# Patient Record
Sex: FEMALE | Race: WHITE | Employment: OTHER | URBAN - NONMETROPOLITAN AREA
[De-identification: names, ages, dates, MRNs, and addresses within clinical notes are randomized per-mention and may not be internally consistent; named-entity substitution may affect disease eponyms.]

---

## 2017-02-13 DIAGNOSIS — F43.10 PTSD (POST-TRAUMATIC STRESS DISORDER): ICD-10-CM

## 2017-02-13 RX ORDER — QUETIAPINE FUMARATE 200 MG/1
TABLET, FILM COATED ORAL
Qty: 30 TABLET | Refills: 0 | Status: SHIPPED | OUTPATIENT
Start: 2017-02-13 | End: 2017-03-21

## 2017-03-21 DIAGNOSIS — F43.10 PTSD (POST-TRAUMATIC STRESS DISORDER): ICD-10-CM

## 2017-03-22 RX ORDER — QUETIAPINE FUMARATE 200 MG/1
TABLET, FILM COATED ORAL
Qty: 30 TABLET | Refills: 0 | Status: SHIPPED | OUTPATIENT
Start: 2017-03-22 | End: 2017-04-19

## 2017-04-19 DIAGNOSIS — F43.10 PTSD (POST-TRAUMATIC STRESS DISORDER): ICD-10-CM

## 2017-04-19 NOTE — TELEPHONE ENCOUNTER
Seroquel     Last Written Prescription Date:3 /22/17  Last Fill Quantity: 30, # refills: 0  [place] [facility] [place] [facility] prescribing provider: 6/27/16       BP Readings from Last 3 Encounters:   06/27/16 128/74   04/06/16 116/72   03/23/16 100/68     Pulse Readings from Last 2 Encounters:   06/27/16 83   04/06/16 76     Lab Results   Component Value Date    GLC 76 12/16/2010     Lab Results   Component Value Date    WBC 7.8 12/16/2010     Lab Results   Component Value Date    RBC 4.35 12/16/2010     Lab Results   Component Value Date    HGB 12.8 12/16/2010     Lab Results   Component Value Date    HCT 38.7 12/16/2010     No components found for: MCT  Lab Results   Component Value Date    MCV 89 12/16/2010     Lab Results   Component Value Date    MCH 29.4 12/16/2010     Lab Results   Component Value Date    MCHC 33.1 12/16/2010     Lab Results   Component Value Date    RDW 13.0 12/16/2010     Lab Results   Component Value Date     12/16/2010     No results found for: CHOL  No results found for: HDL  No results found for: LDL  No results found for: TRIG  No results found for: CHOLHDLRATIO

## 2017-04-20 RX ORDER — QUETIAPINE FUMARATE 200 MG/1
TABLET, FILM COATED ORAL
Qty: 30 TABLET | Refills: 2 | Status: SHIPPED | OUTPATIENT
Start: 2017-04-20 | End: 2018-06-01

## 2017-04-20 NOTE — TELEPHONE ENCOUNTER
Thanks Margaret, I'm going to fill with 2 refills and I'll include note as part of script *need to schedule psychiatry follow-up [phone] for further refills.   I'm reviewing her chart and per Kamran Ag's last note I see Kim was in a pretty stressful situation including doesn't have the $ for transportation to [place].

## 2017-05-27 ENCOUNTER — TRANSFERRED RECORDS (OUTPATIENT)
Dept: HEALTH INFORMATION MANAGEMENT | Facility: HOSPITAL | Age: 27
End: 2017-05-27

## 2017-07-04 DIAGNOSIS — F40.10 SOCIAL ANXIETY DISORDER: ICD-10-CM

## 2017-07-06 RX ORDER — PROPRANOLOL HYDROCHLORIDE 10 MG/1
TABLET ORAL
Qty: 90 TABLET | Refills: 0 | Status: SHIPPED | OUTPATIENT
Start: 2017-07-06 | End: 2017-08-19

## 2017-07-06 NOTE — TELEPHONE ENCOUNTER
Propranolol 10mg      Last Written Prescription Date: 7-5-2016  Last Fill Quantity: 90, # refills: 11    Last Office Visit with G, P or [facility] prescribing provider:  6-   Future Office Visit:        BP Readings from Last 3 Encounters:   06/27/16 128/74   04/06/16 116/72   03/23/16 100/68

## 2017-07-06 NOTE — TELEPHONE ENCOUNTER
Inderal     Last Written Prescription Date: 07/05/2016  Last Fill Quantity: 90, # refills: 11  Last Office Visit with [facility] [place] care provider:  06/27/2016        Last PHQ-9 score on record=   PHQ-9 SCORE 6/27/2016   Total Score 10

## 2017-08-04 ENCOUNTER — TRANSFERRED RECORDS (OUTPATIENT)
Dept: HEALTH INFORMATION MANAGEMENT | Facility: HOSPITAL | Age: 27
End: 2017-08-04

## 2017-08-19 DIAGNOSIS — F40.10 SOCIAL ANXIETY DISORDER: ICD-10-CM

## 2017-08-21 NOTE — TELEPHONE ENCOUNTER
Inderal      Last Written Prescription Date: 7/6/17  Last Fill Quantity: 90, # refills: 0  [place] [facility] [place]:  6/27/16        Last PHQ-9 score on record=   PHQ-9 SCORE 6/27/2016   Total Score 10

## 2017-08-22 RX ORDER — PROPRANOLOL HYDROCHLORIDE 10 MG/1
TABLET ORAL
Qty: 30 TABLET | Refills: 0 | Status: SHIPPED | OUTPATIENT
Start: 2017-08-22 | End: 2017-09-29

## 2017-08-22 NOTE — TELEPHONE ENCOUNTER
Please see notes below.  Patient is due for office visit and PHQ.  Medication pended.  Please advise.  Thank you.

## 2017-08-23 DIAGNOSIS — F40.10 SOCIAL ANXIETY DISORDER: ICD-10-CM

## 2017-08-23 RX ORDER — PROPRANOLOL HYDROCHLORIDE 10 MG/1
TABLET ORAL
Qty: 30 TABLET | OUTPATIENT
Start: 2017-08-23

## 2017-08-23 NOTE — TELEPHONE ENCOUNTER
Inderal      Last Written Prescription Date: 08/22/2017  Last Fill Quantity: 30, # refills: 0    Last Office Visit with G, P or [facility] prescribing provider:  03/23/2016   Future Office Visit:        BP Readings from Last 3 Encounters:   06/27/16 128/74   04/06/16 116/72   03/23/16 100/68

## 2017-08-24 NOTE — TELEPHONE ENCOUNTER
CALLED PATIENT AND INFORMED HER MEDICATION WAS REFILLED AND APPT IS NEEDED SHE ASKED FOR MORE INFORMATION AND PHONE DISCONNECTED,

## 2017-09-08 DIAGNOSIS — F40.10 SOCIAL ANXIETY DISORDER: ICD-10-CM

## 2017-09-08 NOTE — TELEPHONE ENCOUNTER
Propranolol last filled on 8.22.17 #30. Last office visit on 6.24.16. No upcoming appointments. Medications pended.Thank you.

## 2017-09-11 RX ORDER — PROPRANOLOL HYDROCHLORIDE 10 MG/1
TABLET ORAL
Qty: 30 TABLET | Refills: 0 | OUTPATIENT
Start: 2017-09-11

## 2017-09-19 DIAGNOSIS — F40.10 SOCIAL ANXIETY DISORDER: ICD-10-CM

## 2017-09-19 NOTE — TELEPHONE ENCOUNTER
Propranolol       Last Written Prescription Date: 8/22/2017  Last Fill Quantity: 30, # refills: 0    Last Office Visit with G, P or [facility] prescribing provider:  6/27/2016   Future Office Visit:        BP Readings from Last 3 Encounters:   06/27/16 128/74   04/06/16 116/72   03/23/16 100/68

## 2017-09-20 RX ORDER — PROPRANOLOL HYDROCHLORIDE 10 MG/1
TABLET ORAL
Qty: 30 TABLET | Refills: 0 | OUTPATIENT
Start: 2017-09-20

## 2017-09-21 NOTE — TELEPHONE ENCOUNTER
Called patient and she has not taken the medication for months so does not need it refilled but I stated to her that the provider has not seen her for over a year and to schedule appt to keep current she states understanding

## 2017-09-29 DIAGNOSIS — F40.10 SOCIAL ANXIETY DISORDER: ICD-10-CM

## 2017-09-29 RX ORDER — PROPRANOLOL HYDROCHLORIDE 10 MG/1
TABLET ORAL
Qty: 30 TABLET | Refills: 0 | Status: SHIPPED | OUTPATIENT
Start: 2017-09-29 | End: 2018-06-01

## 2017-09-29 NOTE — TELEPHONE ENCOUNTER
Patient of Samantha. Propranolol last filled on 8.22.17 #30. Last office visit on 6.22.16.No upcoming appointments.Mediction pended.Thank you

## 2017-10-29 ENCOUNTER — HEALTH MAINTENANCE LETTER (OUTPATIENT)
Age: 27
End: 2017-10-29

## 2018-04-04 ENCOUNTER — TRANSFERRED RECORDS (OUTPATIENT)
Dept: HEALTH INFORMATION MANAGEMENT | Facility: CLINIC | Age: 28
End: 2018-04-04

## 2018-05-17 ENCOUNTER — TRANSFERRED RECORDS (OUTPATIENT)
Dept: HEALTH INFORMATION MANAGEMENT | Facility: CLINIC | Age: 28
End: 2018-05-17

## 2018-05-31 RX ORDER — HYDROXYZINE HYDROCHLORIDE 10 MG/1
30 TABLET, FILM COATED ORAL
COMMUNITY
End: 2018-06-18

## 2018-05-31 RX ORDER — GABAPENTIN 300 MG/1
400 TABLET, FILM COATED ORAL
COMMUNITY
End: 2018-06-18 | Stop reason: ALTCHOICE

## 2018-06-01 ENCOUNTER — OFFICE VISIT (OUTPATIENT)
Dept: PSYCHIATRY | Facility: OTHER | Age: 28
End: 2018-06-01
Attending: NURSE PRACTITIONER
Payer: COMMERCIAL

## 2018-06-01 VITALS
TEMPERATURE: 98.6 F | HEART RATE: 60 BPM | BODY MASS INDEX: 28.52 KG/M2 | SYSTOLIC BLOOD PRESSURE: 116 MMHG | WEIGHT: 155 LBS | OXYGEN SATURATION: 98 % | DIASTOLIC BLOOD PRESSURE: 68 MMHG | HEIGHT: 62 IN

## 2018-06-01 DIAGNOSIS — F43.10 PTSD (POST-TRAUMATIC STRESS DISORDER): ICD-10-CM

## 2018-06-01 DIAGNOSIS — F60.3 BORDERLINE PERSONALITY DISORDER (H): Primary | ICD-10-CM

## 2018-06-01 DIAGNOSIS — F31.9 BIPOLAR I DISORDER (H): ICD-10-CM

## 2018-06-01 DIAGNOSIS — Z76.0 ENCOUNTER FOR MEDICATION REFILL: ICD-10-CM

## 2018-06-01 LAB
AMPHETAMINES UR QL SCN: NEGATIVE
AMPHETAMINES UR QL: NOT DETECTED NG/ML
BARBITURATES UR QL SCN: NOT DETECTED NG/ML
BARBITURATES UR QL: NEGATIVE
BENZODIAZ UR QL SCN: NOT DETECTED NG/ML
BENZODIAZ UR QL: NEGATIVE
BUPRENORPHINE UR QL: ABNORMAL NG/ML
CANNABINOIDS UR QL SCN: POSITIVE
CANNABINOIDS UR QL: ABNORMAL NG/ML
COCAINE UR QL SCN: NOT DETECTED NG/ML
COCAINE UR QL: NEGATIVE
D-METHAMPHET UR QL: NOT DETECTED NG/ML
ERYTHROCYTE [DISTWIDTH] IN BLOOD BY AUTOMATED COUNT: 12.3 % (ref 10–15)
HCT VFR BLD AUTO: 39.2 % (ref 35–47)
HGB BLD-MCNC: 13.5 G/DL (ref 11.7–15.7)
MCH RBC QN AUTO: 30.9 PG (ref 26.5–33)
MCHC RBC AUTO-ENTMCNC: 34.4 G/DL (ref 31.5–36.5)
MCV RBC AUTO: 90 FL (ref 78–100)
METHADONE UR QL SCN: NEGATIVE
METHADONE UR QL SCN: NOT DETECTED NG/ML
OPIATES UR QL SCN: NEGATIVE
OPIATES UR QL SCN: NOT DETECTED NG/ML
OXYCODONE UR QL SCN: NOT DETECTED NG/ML
PCP UR QL SCN: NEGATIVE
PCP UR QL SCN: NOT DETECTED NG/ML
PLATELET # BLD AUTO: 261 10E9/L (ref 150–450)
PROPOXYPH UR QL: NOT DETECTED NG/ML
RBC # BLD AUTO: 4.37 10E12/L (ref 3.8–5.2)
TRICYCLICS UR QL SCN: NOT DETECTED NG/ML
WBC # BLD AUTO: 8.6 10E9/L (ref 4–11)

## 2018-06-01 PROCEDURE — 85027 COMPLETE CBC AUTOMATED: CPT | Mod: ZL | Performed by: NURSE PRACTITIONER

## 2018-06-01 PROCEDURE — 80307 DRUG TEST PRSMV CHEM ANLYZR: CPT | Mod: ZL | Performed by: NURSE PRACTITIONER

## 2018-06-01 PROCEDURE — G0463 HOSPITAL OUTPT CLINIC VISIT: HCPCS

## 2018-06-01 PROCEDURE — 36415 COLL VENOUS BLD VENIPUNCTURE: CPT | Mod: ZL | Performed by: NURSE PRACTITIONER

## 2018-06-01 PROCEDURE — 99213 OFFICE O/P EST LOW 20 MIN: CPT | Performed by: NURSE PRACTITIONER

## 2018-06-01 ASSESSMENT — PAIN SCALES - GENERAL: PAINLEVEL: NO PAIN (0)

## 2018-06-01 ASSESSMENT — ANXIETY QUESTIONNAIRES
4. TROUBLE RELAXING: MORE THAN HALF THE DAYS
6. BECOMING EASILY ANNOYED OR IRRITABLE: NEARLY EVERY DAY
IF YOU CHECKED OFF ANY PROBLEMS ON THIS QUESTIONNAIRE, HOW DIFFICULT HAVE THESE PROBLEMS MADE IT FOR YOU TO DO YOUR WORK, TAKE CARE OF THINGS AT HOME, OR GET ALONG WITH OTHER PEOPLE: SOMEWHAT DIFFICULT
7. FEELING AFRAID AS IF SOMETHING AWFUL MIGHT HAPPEN: MORE THAN HALF THE DAYS
5. BEING SO RESTLESS THAT IT IS HARD TO SIT STILL: MORE THAN HALF THE DAYS
3. WORRYING TOO MUCH ABOUT DIFFERENT THINGS: NEARLY EVERY DAY
2. NOT BEING ABLE TO STOP OR CONTROL WORRYING: NEARLY EVERY DAY
GAD7 TOTAL SCORE: 17
1. FEELING NERVOUS, ANXIOUS, OR ON EDGE: MORE THAN HALF THE DAYS
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
## 2018-06-01 NOTE — NURSING NOTE
"Chief Complaint   Patient presents with     Consult       Initial /68 (BP Location: Right arm, Patient Position: Sitting, Cuff Size: Adult Regular)  Pulse 60  Temp 98.6  F (37  C) (Tympanic)  Ht 5' 2\" (1.575 m)  Wt 155 lb (70.3 kg)  SpO2 98%  BMI 28.35 kg/m2 Estimated body mass index is 28.35 kg/(m^2) as calculated from the following:    Height as of this encounter: 5' 2\" (1.575 m).    Weight as of this encounter: 155 lb (70.3 kg).  Medication Reconciliation: complete    Keily Artis LPN  "

## 2018-06-01 NOTE — MR AVS SNAPSHOT
"              After Visit Summary   6/1/2018    Kim Oliva    MRN: [id number]           Patient Information     Date Of Birth          1990        Visit Information        Provider Department      6/1/2018 10:30 AM Venessa Gilman APRN [facility]        Today's Diagnoses     Borderline personality disorder    -  1    PTSD (post-traumatic stress disorder)        Encounter for medication refill        Bipolar I disorder (H)           Follow-ups after your visit        Who to contact     If you have questions or need follow up information about today's clinic visit or your schedule please contact [facility] directly at [phone].  Normal or non-critical lab and imaging results will be communicated to you by MyChart, letter or phone within 4 business days after the clinic has received the results. If you do not hear from us within 7 days, please contact the clinic through MyChart or phone. If you have a critical or abnormal lab result, we will notify you by phone as soon as possible.  Submit refill requests through [employer] or call your pharmacy and they will forward the refill request to us. Please allow 3 business days for your refill to be completed.          Additional Information About Your Visit        Care EveryWhere ID     This is your Care EveryWhere ID. This could be used by other organizations to access your [facility] medical records  [id number]        Your Vitals Were     Pulse Temperature Height Pulse Oximetry BMI (Body Mass Index)       60 98.6  F (37  C) (Tympanic) 5' 2\" (1.575 m) 98% 28.35 kg/m2        Blood Pressure from Last 3 Encounters:   06/01/18 116/68   06/27/16 128/74   04/06/16 116/72    Weight from Last 3 Encounters:   06/01/18 155 lb (70.3 kg)   06/27/16 170 lb (77.1 kg)   04/06/16 176 lb (79.8 kg)              We Performed the Following     CBC with platelets     Drug Screen Urine (Range)        Primary Care Provider Office Phone # Fax #    Denise " CHRSI Lynne [phone]       [address] [facility] [address]        Equal Access to Services     BRIAN ADAME : Hadii aad ku hadsuncorinna Castellon, walucianoda luqadaha, qaenmanuelta kaalmada marjorie, shanna dipikain hayaaya reddybeba cariadán sofie gomez. So [facility] [phone].    ATENCIÓN: Si habla español, tiene a castillo disposición servicios gratuitos de asistencia lingüística. Llame al [phone].    We comply with applicable federal civil rights laws and [place] laws. We do not discriminate on the basis of race, color, national origin, age, disability, sex, sexual orientation, or gender identity.            Thank you!     Thank you for choosing [facility]  for your care. Our goal is always to provide you with excellent care. Hearing back from our patients is one way we can continue to improve our services. Please take a few minutes to complete the written survey that you may receive in the mail after your visit with us. Thank you!             Your Updated Medication List - Protect others around you: Learn how to safely use, store and throw away your medicines at www.disposemymeds.org.          This list is accurate as of 6/1/18 11:41 AM.  Always use your most recent med list.                   Brand Name Dispense Instructions for use Diagnosis    gabapentin 300 MG 24 hr tablet    GRALISE     Take 400 mg by mouth        gabapentin 300 MG capsule    NEURONTIN    90 capsule    Take 1 capsule (300 mg) by mouth 3 times daily    JAYLEEN (generalized anxiety disorder)       hydrOXYzine 10 MG tablet    ATARAX     Take 30 mg by mouth        mirtazapine 15 MG tablet    REMERON    30 tablet    Take 1 tablet (15 mg) by mouth At Bedtime    Anxiety       propranolol 10 MG tablet    INDERAL    30 tablet    TAKE 1 TABLET BY MOUTH THREE TIMES DAILY    Social anxiety disorder       QUEtiapine 200 MG tablet    SEROquel    30 tablet    Take 1 tab evening as needed *need to schedule follow-up apptmt for  further refills [phone]    PTSD (post-traumatic stress disorder)       sertraline 100 MG tablet    ZOLOFT    45 tablet    Take 1.5 tablets (150 mg) by mouth daily    Anxiety

## 2018-06-02 ASSESSMENT — PATIENT HEALTH QUESTIONNAIRE - PHQ9: SUM OF ALL RESPONSES TO PHQ QUESTIONS 1-9: 10

## 2018-06-02 ASSESSMENT — ANXIETY QUESTIONNAIRES: GAD7 TOTAL SCORE: 17

## 2018-06-04 LAB — MISCELLANEOUS TEST: NORMAL

## 2018-06-18 ENCOUNTER — OFFICE VISIT (OUTPATIENT)
Dept: PSYCHIATRY | Facility: OTHER | Age: 28
End: 2018-06-18
Attending: NURSE PRACTITIONER
Payer: COMMERCIAL

## 2018-06-18 VITALS
SYSTOLIC BLOOD PRESSURE: 124 MMHG | OXYGEN SATURATION: 98 % | HEART RATE: 77 BPM | TEMPERATURE: 99.8 F | DIASTOLIC BLOOD PRESSURE: 80 MMHG

## 2018-06-18 DIAGNOSIS — F41.9 ANXIETY: Primary | ICD-10-CM

## 2018-06-18 DIAGNOSIS — F43.12 NIGHTMARES ASSOCIATED WITH CHRONIC POST-TRAUMATIC STRESS DISORDER: ICD-10-CM

## 2018-06-18 DIAGNOSIS — F33.1 MAJOR DEPRESSIVE DISORDER, RECURRENT EPISODE, MODERATE (H): ICD-10-CM

## 2018-06-18 DIAGNOSIS — F31.9 BIPOLAR 1 DISORDER, DEPRESSED (H): Primary | ICD-10-CM

## 2018-06-18 DIAGNOSIS — F41.9 ANXIETY: ICD-10-CM

## 2018-06-18 DIAGNOSIS — F51.5 NIGHTMARES ASSOCIATED WITH CHRONIC POST-TRAUMATIC STRESS DISORDER: ICD-10-CM

## 2018-06-18 PROCEDURE — 99213 OFFICE O/P EST LOW 20 MIN: CPT | Performed by: NURSE PRACTITIONER

## 2018-06-18 PROCEDURE — G0463 HOSPITAL OUTPT CLINIC VISIT: HCPCS

## 2018-06-18 RX ORDER — MIRTAZAPINE 15 MG/1
15 TABLET, FILM COATED ORAL AT BEDTIME
Qty: 30 TABLET | Refills: 0 | Status: SHIPPED | OUTPATIENT
Start: 2018-06-18 | End: 2018-06-25 | Stop reason: DRUGHIGH

## 2018-06-18 RX ORDER — BUSPIRONE HYDROCHLORIDE 15 MG/1
15 TABLET ORAL 2 TIMES DAILY
Qty: 60 TABLET | Refills: 0 | Status: SHIPPED | OUTPATIENT
Start: 2018-06-18 | End: 2018-06-25 | Stop reason: DRUGHIGH

## 2018-06-18 RX ORDER — PRAZOSIN HYDROCHLORIDE 2 MG/1
2 CAPSULE ORAL AT BEDTIME
Qty: 30 CAPSULE | Refills: 0 | Status: SHIPPED | OUTPATIENT
Start: 2018-06-18 | End: 2018-06-25 | Stop reason: DRUGHIGH

## 2018-06-18 RX ORDER — LAMOTRIGINE 150 MG/1
150 TABLET ORAL DAILY
Qty: 30 TABLET | Refills: 0 | Status: SHIPPED | OUTPATIENT
Start: 2018-06-18 | End: 2018-06-25 | Stop reason: DRUGHIGH

## 2018-06-18 RX ORDER — VENLAFAXINE HYDROCHLORIDE 150 MG/1
150 TABLET, EXTENDED RELEASE ORAL DAILY
Qty: 30 TABLET | Refills: 1 | Status: SHIPPED | OUTPATIENT
Start: 2018-06-18 | End: 2018-06-25 | Stop reason: DRUGHIGH

## 2018-06-18 RX ORDER — GABAPENTIN 600 MG/1
600 TABLET ORAL 3 TIMES DAILY
Qty: 90 TABLET | Refills: 0 | Status: SHIPPED | OUTPATIENT
Start: 2018-06-18 | End: 2018-06-25 | Stop reason: DRUGHIGH

## 2018-06-18 RX ORDER — HYDROXYZINE HYDROCHLORIDE 10 MG/1
30 TABLET, FILM COATED ORAL DAILY
Qty: 30 TABLET | Refills: 0 | Status: SHIPPED | OUTPATIENT
Start: 2018-06-18 | End: 2018-06-25 | Stop reason: DRUGHIGH

## 2018-06-18 ASSESSMENT — ANXIETY QUESTIONNAIRES
IF YOU CHECKED OFF ANY PROBLEMS ON THIS QUESTIONNAIRE, HOW DIFFICULT HAVE THESE PROBLEMS MADE IT FOR YOU TO DO YOUR WORK, TAKE CARE OF THINGS AT HOME, OR GET ALONG WITH OTHER PEOPLE: SOMEWHAT DIFFICULT
2. NOT BEING ABLE TO STOP OR CONTROL WORRYING: NEARLY EVERY DAY
5. BEING SO RESTLESS THAT IT IS HARD TO SIT STILL: MORE THAN HALF THE DAYS
GAD7 TOTAL SCORE: 17
7. FEELING AFRAID AS IF SOMETHING AWFUL MIGHT HAPPEN: MORE THAN HALF THE DAYS
3. WORRYING TOO MUCH ABOUT DIFFERENT THINGS: NEARLY EVERY DAY
4. TROUBLE RELAXING: MORE THAN HALF THE DAYS
6. BECOMING EASILY ANNOYED OR IRRITABLE: NEARLY EVERY DAY
1. FEELING NERVOUS, ANXIOUS, OR ON EDGE: MORE THAN HALF THE DAYS

## 2018-06-18 ASSESSMENT — PAIN SCALES - GENERAL: PAINLEVEL: NO PAIN (0)

## 2018-06-18 NOTE — PROGRESS NOTES
"PSYCHIATRY CLINIC PROGRESS NOTE   30 minute medication management, more than 50% of time spent counseling patient on medications, medication side effects, symptom history and management   SUBJECTIVE / INTERIM HISTORY                                                                       Kim presents with her little toddlerSelina. She is trying to transitioning from the doctor at [place]. She is also on 18mg Suboxone from [facility] in [place].    When asked whether the Suboxone clinic has also prescribed the Clonazepam, Kim says that it was [facility] that prescribed it at the same time as the Suboxone.   When asked about a primary, Kim says she does not have one at this time. She is encouraged to establish with primary care.  She is calling her pharmacy now because apparently our medication list is \"missing a few things.\"   She says she is \"out of all her medications right now\"   Her main issue is that she has big mood swings.  She gets to a low phase and just feels extreme depression and crying. She would like to increase the Lamictal,  and that we can do.  She knows to watch for rash.   She does endorse some moments of marco, and \"shops, doesn't sleep, gets grandiose with a lot of house cleaning\" then \"shuts down\" with depression and gets very low.   No AH/VH  No SI/ HI     SUBSTANCE USE- no issues, \"I'm in recovery\" though Tox 13 showed THC last visit, 06/05/2018, she says she has a prescription for medical MJ> Will bring that in.      SYMPTOMS- Anxiety, mood swings with hypomania and depression, No SI.  MEDICAL ROS- No N/V/d/C  MEDICAL / SURGICAL HISTORY                pregnant [if applicable]--no   Medical Team:     PMD-     Therapist-       Patient Active Problem List   Diagnosis     Bipolar 1      Anxiety                           ALLERGY   Review of patient's allergies indicates      Allergies   Allergen Reactions     Amoxicillin Hives     Fd&C Blue #1-Fd&C Red #40-Fluoxetine Other (See Comments)     " Other reaction(s): Joint Pain     Penicillins Hives     Prozac [Fluoxetine] Other (See Comments)     Muscle and bone aches and migraines         MEDICATIONS                                                                                                   Current Outpatient Prescriptions   Medication Sig       Current Outpatient Prescriptions:      hydrOXYzine (ATARAX) 10 MG tablet, Take 30 mg by mouth, Disp: , Rfl:      lamoTRIgine (LAMICTAL) 150 MG tablet, Take 1 tablet (150 mg) by mouth daily, Disp: 30 tablet, Rfl: 0     mirtazapine (REMERON) 15 MG tablet, Take 1 tablet (15 mg) by mouth At Bedtime, Disp: 30 tablet, Rfl: 2     gabapentin (NEURONTIN) 300 MG capsule, Take 1 capsule (300 mg) by mouth 3 times daily, Disp: 90 capsule, Rfl: 3                                               No current facility-administered medications for this visit.          VITALS    /80 (BP Location: Right arm, Patient Position: Sitting, Cuff Size: Adult Regular)  Pulse 77  Temp 99.8  F (37.7  C) (Tympanic)  SpO2 98%    PHQ9                        PHQ-9 SCORE date date date   Total Score - - -   Total Score 1 1          MENTAL STATUS EXAM                                                                                        Alert. Oriented to person, place, and date / time. Well groomed, calm, cooperative with good eye contact. No problems with speech or psychomotor behavior. Mood was euthymic and affect congruent to speech content and full range. Thought process, including associations, was unremarkable and thought content was devoid of suicidal and homicidal ideation and psychotic thought. No hallucinations. Insight was good. Judgment was intact and adequate for safety. Fund of knowledge was intact. Pt demonstrates no obvious problems with attention, concentration, language, recent or remote memory although these were not formally tested.      ASSESSMENT                                                                                                        HISTORICAL:  follow-up  psych consult       CURRENT: This patient provides a history which supports the diagnoses Bipolar 1, depressed mood and anxiety but no psychotic features.      TREATMENT RISK STATEMENT: The risks, benefits, alternatives and potential adverse effects have been explained and are understood by the pt. The pt agrees to the treatment plan with the ability to do so. The pt knows to call the clinic for any problems or access emergency care if needed.    DIAGNOSES            (F31.9) Bipolar 1 disorder, depressed (H)  (primary encounter diagnosis)       LABS         Office Visit on 06/01/2018   Component Date Value Ref Range Status     WBC 06/01/2018 8.6  4.0 - 11.0 10e9/L Final     RBC Count 06/01/2018 4.37  3.8 - 5.2 10e12/L Final     Hemoglobin 06/01/2018 13.5  11.7 - 15.7 g/dL Final     Hematocrit 06/01/2018 39.2  35.0 - 47.0 % Final     MCV 06/01/2018 90  78 - 100 fl Final     MCH 06/01/2018 30.9  26.5 - 33.0 pg Final     MCHC 06/01/2018 34.4  31.5 - 36.5 g/dL Final     RDW 06/01/2018 12.3  10.0 - 15.0 % Final     Platelet Count 06/01/2018 261  150 - 450 10e9/L Final     Amphetamine Qual Urine 06/01/2018 Negative  NEG^Negative Final    Cutoff for a negative amphetamine is 500 ng/mL or less.     Barbiturates Qual Urine 06/01/2018 Negative  NEG^Negative Final    Cutoff for a negative barbiturate is 200 ng/mL or less.     Benzodiazepine Qual Urine 06/01/2018 Negative  NEG^Negative Final    Cutoff for a negative benzodiazepine is 200 ng/mL or less.     Cannabinoids Qual Urine 06/01/2018 Positive* NEG^Negative Final    Comment: Cutoff for a positive cannabinoid is greater than 50 ng/mL. This is an   unconfirmed screening result to be used for medical purposes only.       Cocaine Qual Urine 06/01/2018 Negative  NEG^Negative Final    Cutoff for a negative cocaine is 300 ng/mL or less.     Opiates Qualitative Urine 06/01/2018 Negative  NEG^Negative Final    Cutoff for a  negative opiate is 300 ng/mL or less.     Methadone Qual Urine 06/01/2018 Negative  NEG^Negative Final    Cutoff for a negative methadone is 300 ng/mL or less.     PCP Qual Urine 06/01/2018 Negative  NEG^Negative Final    Cutoff for a negative PCP is 25 ng/mL or less.     Miscellaneous Test 06/01/2018      Final                    Value:Specimen Received, Reordered and sent to Performing laboratory - Report to follow upon   completion.       Cannabinoids (39-alp-3-carboxy-9-T* 06/01/2018 Detected, Abnormal Result* NDET^Not Detected ng/mL Final    Comment: Cutoff for a positive cannabinoid is greater than 50 ng/ml.  This is an unconfirmed screening result to be used for medical purposes only.   Order [id number] for confirmation or individual confirmation tests to [facility].       Phencyclidine (Phencyclidine) 06/01/2018 Not Detected  NDET^Not Detected ng/mL Final    Cutoff for a negative PCP is 25 ng/mL or less.     Cocaine (Benzoylecgonine) 06/01/2018 Not Detected  NDET^Not Detected ng/mL Final    Cutoff for a negative cocaine is 150 ng/ml or less.     Methamphetamine (d-Methamphetamine) 06/01/2018 Not Detected  NDET^Not Detected ng/mL Final    Cutoff for a negative methamphetamine is 500 ng/ml or less.     Opiates (Morphine) 06/01/2018 Not Detected  NDET^Not Detected ng/mL Final    Cutoff for a negative opiate is 100 ng/ml or less.     Amphetamine (d-Amphetamine) 06/01/2018 Not Detected  NDET^Not Detected ng/mL Final    Cutoff for a negative amphetamine is 500 ng/mL or less.     Benzodiazepines (Nordiazepam) 06/01/2018 Not Detected  NDET^Not Detected ng/mL Final    Cutoff for a negative benzodiazepine is 150 ng/ml or less.     Tricyclic Antidepressants (Desipra* 06/01/2018 Not Detected  NDET^Not Detected ng/mL Final    Cutoff for a negative tricyclic antidepressant is 300 ng/ml or less.     Methadone (Methadone) 06/01/2018 Not Detected  NDET^Not Detected ng/mL Final    Cutoff for a negative methadone is 200 ng/ml or  less.     Barbiturates (Butalbital) 06/01/2018 Not Detected  NDET^Not Detected ng/mL Final    Cutoff for a negative barbituate is 200 ng/ml or less.     Oxycodone (Oxycodone) 06/01/2018 Not Detected  NDET^Not Detected ng/mL Final    Cutoff for a negative Oxycodone is 100 ng/mL or less.     Propoxyphene (Norpropoxyphene) 06/01/2018 Not Detected  NDET^Not Detected ng/mL Final    Cutoff for a negative propoxyphene is 300 ng/ml or less     Buprenorphine (Buprenorphine) 06/01/2018 Detected, Abnormal Result* NDET^Not Detected ng/mL Final    Comment: Cutoff for a positive buprenorphine is greater than 10 ng/ml.  This is an unconfirmed screening result to be used for medical purposes only.   Order [id number] for confirmation or individual confirmation tests to [facility].         PLAN                                                                                                                          1) MEDICATIONS: Increase Lamictal to 150mg X 1 daily PO. Will revisit question of adding antidepressant once this change is in place.      2) THERAPY: No Change.  Sees two therapists, one at [facility] / and sees another closer to [place].      3) LABS: none ordered      4) PT MONITOR [call for probs]: Worsening symptoms, side effects from medications, SI/HI     5) REFERRALS [CD tx, medical, tests other]:      6)  RTC: 2 months

## 2018-06-18 NOTE — NURSING NOTE
"Chief Complaint   Patient presents with     RECHECK       Initial /80 (BP Location: Right arm, Patient Position: Sitting, Cuff Size: Adult Regular)  Pulse 77  Temp 99.8  F (37.7  C) (Tympanic)  SpO2 98% Estimated body mass index is 28.35 kg/(m^2) as calculated from the following:    Height as of 6/1/18: 5' 2\" (1.575 m).    Weight as of 6/1/18: 155 lb (70.3 kg).  Medication Reconciliation: complete    Keily Artis LPN  "

## 2018-06-18 NOTE — MR AVS SNAPSHOT
After Visit Summary   6/18/2018    Kim Oliva    MRN: [id number]           Patient Information     Date Of Birth          1990        Visit Information        Provider Department      6/18/2018 11:30 AM Venessa Gilman APRN CNP [facility]        Today's Diagnoses     Bipolar 1 disorder, depressed (H)    -  1    Anxiety           Follow-ups after your visit        Who to contact     If you have questions or need follow up information about today's clinic visit or your schedule please contact [facility] directly at [phone].  Normal or non-critical lab and imaging results will be communicated to you by MyChart, letter or phone within 4 business days after the clinic has received the results. If you do not hear from us within 7 days, please contact the clinic through MyChart or phone. If you have a critical or abnormal lab result, we will notify you by phone as soon as possible.  Submit refill requests through [employer] or call your pharmacy and they will forward the refill request to us. Please allow 3 business days for your refill to be completed.          Additional Information About Your Visit        Care EveryWhere ID     This is your Care EveryWhere ID. This could be used by other organizations to access your [facility] medical records  [id number]        Your Vitals Were     Pulse Temperature Pulse Oximetry             77 99.8  F (37.7  C) (Tympanic) 98%          Blood Pressure from Last 3 Encounters:   06/18/18 124/80   06/01/18 116/68   06/27/16 128/74    Weight from Last 3 Encounters:   06/01/18 155 lb (70.3 kg)   06/27/16 170 lb (77.1 kg)   04/06/16 176 lb (79.8 kg)              Today, you had the following     No orders found for display         Today's Medication Changes          These changes are accurate as of 6/18/18 11:59 AM.  If you have any questions, ask your nurse or doctor.               Start taking these medicines.        Dose/Directions     lamoTRIgine 150 MG tablet   Commonly known as:  LaMICtal   Used for:  Bipolar 1 disorder, depressed (H)   Started by:  Venessa Gilman APRN CNP        Dose:  150 mg   Take 1 tablet (150 mg) by mouth daily   Quantity:  30 tablet   Refills:  0         Stop taking these medicines if you haven't already. Please contact your care team if you have questions.     gabapentin 300 MG 24 hr tablet   Commonly known as:  GRALISE   Stopped by:  Venessa Gilman APRN CNP           sertraline 100 MG tablet   Commonly known as:  ZOLOFT   Stopped by:  Venessa Gilman APRN CNP                Where to get your medicines      These medications were sent to [place] [address] [place] [address] [address]     Phone:  [phone]     lamoTRIgine 150 MG tablet    mirtazapine 15 MG tablet                Primary Care Provider Office Phone # Fax #    Denise HARDINGThu Lynne [phone]       [address] [place] [address]        Equal Access to Services     [facility]: Hadii aad ku hadasho Soomaali, waaxda luqadaha, qaybta kaalmada adeegyaroz, shanna gomez. So [facility] [phone].    ATENCIÓN: Si habla español, tiene a castillo disposición servicios gratuitos de asistencia lingüística. Jerrod al [phone].    We comply with applicable federal civil rights laws and [place] laws. We do not discriminate on the basis of race, color, national origin, age, disability, sex, sexual orientation, or gender identity.            Thank you!     Thank you for choosing [facility]  for your care. Our goal is always to provide you with excellent care. Hearing back from our patients is one way we can continue to improve our services. Please take a few minutes to complete the written survey that you may receive in the mail after your visit with us. Thank you!             Your Updated Medication List - Protect others  around you: Learn how to safely use, store and throw away your medicines at www.disposemymeds.org.          This list is accurate as of 6/18/18 11:59 AM.  Always use your most recent med list.                   Brand Name Dispense Instructions for use Diagnosis    gabapentin 300 MG capsule    NEURONTIN    90 capsule    Take 1 capsule (300 mg) by mouth 3 times daily    JAYLEEN (generalized anxiety disorder)       hydrOXYzine 10 MG tablet    ATARAX     Take 30 mg by mouth        lamoTRIgine 150 MG tablet    LaMICtal    30 tablet    Take 1 tablet (150 mg) by mouth daily    Bipolar 1 disorder, depressed (H)       mirtazapine 15 MG tablet    REMERON    30 tablet    Take 1 tablet (15 mg) by mouth At Bedtime    Anxiety

## 2018-06-19 ASSESSMENT — PATIENT HEALTH QUESTIONNAIRE - PHQ9: SUM OF ALL RESPONSES TO PHQ QUESTIONS 1-9: 8

## 2018-06-19 ASSESSMENT — ANXIETY QUESTIONNAIRES: GAD7 TOTAL SCORE: 17

## 2018-06-20 ENCOUNTER — TELEPHONE (OUTPATIENT)
Dept: PSYCHIATRY | Facility: OTHER | Age: 28
End: 2018-06-20

## 2018-06-20 NOTE — TELEPHONE ENCOUNTER
Called pt.  Discussed the clonazepam taper.  Pt replies that she is aware of that.  Went on to state that she did not appreciate the medication changes on two of her meds.  Was not aware of any other med changes; and these were not discussed with her.  Buspar being one affected.  She would like to come in next week, either Wednesday or Friday to discuss with Iwona.  At that point the phone cut out and disconnected.  Called pt back.   Pt states that she is driving to an appt.   Asked if next Friday is convenient; 6/29.  Phone again disconnected.  Made appt for 10:30 next Friday.  Will call pt to inform later today.  She also needs PCP.  Keily Artis

## 2018-06-20 NOTE — TELEPHONE ENCOUNTER
----- Message from MANASA Jenkins CNP sent at 6/20/2018  7:27 AM CDT -----  Good Afternoon, Gina!  I wonder whether we could get this patient scheduled for her Primary with anyone here, Henny Naik possibly, and call her about it. She has no current primary and wants a BP medication.  Also remind her to taper slowly on her Clonazepam, we agreed she would taper on it, since it's not safe with her Suboxone.     Thanks!      Kim Oliva  27 year old female  1990    Comm Pref:         [facility] [id number]

## 2018-06-21 ENCOUNTER — TELEPHONE (OUTPATIENT)
Dept: PSYCHIATRY | Facility: OTHER | Age: 28
End: 2018-06-21

## 2018-06-21 NOTE — TELEPHONE ENCOUNTER
"----- Message from MANASA Jenkins CNP sent at 6/21/2018 10:15 AM CDT -----  Hi, Gina, to prevent the \"I'm in withdrawal, you HAVE to fill my Clonazepam\" drama, do you think we could get Kim in to see use before next Friday?     If you could find a primary (Henny Naik said she would see her) and get her into that primary this week, that would be so great. And if we could get an HARITHA for Venancio Faust? Please try to get her to come in and sign that? Thanks!        PjKim  MRN:   [id number]  Female, 27 year old, 1990  "

## 2018-06-21 NOTE — TELEPHONE ENCOUNTER
Called pt; phone rang once, was answered and hung up without any comment.  Keily Artis    Appt was made for Wed, 6/27, with Iwona.  Also needs to establish with PCP, Henny Naik, if pt approves.  HARITHA for [facility] needs to be signed as well.  Keily Artis

## 2018-06-22 ENCOUNTER — TELEPHONE (OUTPATIENT)
Dept: PSYCHIATRY | Facility: OTHER | Age: 28
End: 2018-06-22

## 2018-06-22 NOTE — TELEPHONE ENCOUNTER
Called pt.  She is good with establishing with Henny for her general health.  Asked pt to come in on Wednesday, 6/27; she will be here earlier than appt time with Henny, in the event that Iwona could see her first.  Will need HARITHA to [facility] signed at that time.  She did ask about her med changes; told her that Iwona will discuss at appt.  She seemed to be fine with her dose decreases; only wants to be informed prior to changes.  Noted.  Keily Artis

## 2018-06-26 RX ORDER — PROPRANOLOL HYDROCHLORIDE 10 MG/1
TABLET ORAL
Refills: 0 | COMMUNITY
Start: 2017-09-29 | End: 2018-06-27

## 2018-06-26 RX ORDER — HYDROXYZINE PAMOATE 50 MG/1
CAPSULE ORAL
Refills: 1 | COMMUNITY
Start: 2018-06-08 | End: 2018-06-27

## 2018-06-26 RX ORDER — VALACYCLOVIR HYDROCHLORIDE 1 G/1
1000 TABLET, FILM COATED ORAL DAILY
Refills: 1 | COMMUNITY
Start: 2017-11-20 | End: 2018-06-27

## 2018-06-26 RX ORDER — VENLAFAXINE HYDROCHLORIDE 150 MG/1
150 TABLET, EXTENDED RELEASE ORAL DAILY
Refills: 1 | COMMUNITY
Start: 2018-06-19

## 2018-06-26 RX ORDER — PRAZOSIN HYDROCHLORIDE 2 MG/1
CAPSULE ORAL
Refills: 0 | COMMUNITY
Start: 2018-06-18 | End: 2019-01-10

## 2018-06-26 RX ORDER — CLONAZEPAM 1 MG/1
TABLET ORAL
COMMUNITY
Start: 2018-06-23 | End: 2018-06-27

## 2018-06-26 RX ORDER — IBUPROFEN 800 MG/1
TABLET, FILM COATED ORAL
Refills: 0 | COMMUNITY
Start: 2017-08-05 | End: 2018-06-27

## 2018-06-26 RX ORDER — MICONAZOLE NITRATE 2 G/100G
CREAM VAGINAL
Refills: 0 | COMMUNITY
Start: 2018-04-18 | End: 2018-06-27

## 2018-06-26 RX ORDER — MIRTAZAPINE 15 MG/1
TABLET, FILM COATED ORAL
Refills: 0 | COMMUNITY
Start: 2018-06-18 | End: 2019-01-10

## 2018-06-26 RX ORDER — SERTRALINE HYDROCHLORIDE 100 MG/1
TABLET, FILM COATED ORAL
Refills: 2 | COMMUNITY
Start: 2018-04-10 | End: 2018-06-27

## 2018-06-26 RX ORDER — NITROFURANTOIN 25; 75 MG/1; MG/1
CAPSULE ORAL
Refills: 0 | COMMUNITY
Start: 2018-04-18 | End: 2018-06-27

## 2018-06-26 RX ORDER — LAMOTRIGINE 150 MG/1
200 TABLET ORAL DAILY
Refills: 0 | COMMUNITY
Start: 2018-06-18 | End: 2019-01-21

## 2018-06-27 ENCOUNTER — OFFICE VISIT (OUTPATIENT)
Dept: FAMILY MEDICINE | Facility: OTHER | Age: 28
End: 2018-06-27
Attending: NURSE PRACTITIONER
Payer: COMMERCIAL

## 2018-06-27 VITALS
SYSTOLIC BLOOD PRESSURE: 116 MMHG | HEART RATE: 83 BPM | OXYGEN SATURATION: 99 % | DIASTOLIC BLOOD PRESSURE: 84 MMHG | BODY MASS INDEX: 28.35 KG/M2 | TEMPERATURE: 99.1 F | WEIGHT: 155 LBS

## 2018-06-27 DIAGNOSIS — G47.00 INSOMNIA, UNSPECIFIED TYPE: Primary | ICD-10-CM

## 2018-06-27 DIAGNOSIS — F41.9 ANXIETY: ICD-10-CM

## 2018-06-27 PROCEDURE — G0463 HOSPITAL OUTPT CLINIC VISIT: HCPCS

## 2018-06-27 PROCEDURE — 99213 OFFICE O/P EST LOW 20 MIN: CPT | Performed by: NURSE PRACTITIONER

## 2018-06-27 RX ORDER — CLONAZEPAM 0.5 MG/1
0.5 TABLET ORAL DAILY PRN
Qty: 14 TABLET | Refills: 0 | Status: SHIPPED | OUTPATIENT
Start: 2018-06-27

## 2018-06-27 RX ORDER — HYDROXYZINE PAMOATE 50 MG/1
CAPSULE ORAL
Qty: 30 CAPSULE | Refills: 1 | Status: SHIPPED | OUTPATIENT
Start: 2018-06-27

## 2018-06-27 RX ORDER — GABAPENTIN 600 MG/1
600 TABLET ORAL 3 TIMES DAILY
COMMUNITY
End: 2019-01-10

## 2018-06-27 ASSESSMENT — PAIN SCALES - GENERAL: PAINLEVEL: NO PAIN (0)

## 2018-06-27 NOTE — MR AVS SNAPSHOT
After Visit Summary   6/27/2018    Kim Oliva    MRN: [id number]           Patient Information     Date Of Birth          1990        Visit Information        Provider Department      6/27/2018 6:20 PM Henny Naik APRN CNP [facility]        Care Instructions    [facility]  [address] [place] [place]  Phone number [phone]      Scheduling appointments   [phone] or  [phone]      Nurse Judy GOEL [facility]  Family Nurse Practitioner                Follow-ups after your visit        Follow-up notes from your care team     Return in about 4 weeks (around 7/25/2018).      Who to contact     If you have questions or need follow up information about today's clinic visit or your schedule please contact [facility] directly at [phone].  Normal or non-critical lab and imaging results will be communicated to you by MyChart, letter or phone within 4 business days after the clinic has received the results. If you do not hear from us within 7 days, please contact the clinic through MyChart or phone. If you have a critical or abnormal lab result, we will notify you by phone as soon as possible.  Submit refill requests through [employer] or call your pharmacy and they will forward the refill request to us. Please allow 3 business days for your refill to be completed.          Additional Information About Your Visit        Care EveryWhere ID     This is your Care EveryWhere ID. This could be used by other organizations to access your [facility] medical records  [id number]        Your Vitals Were     Pulse Temperature Pulse Oximetry BMI (Body Mass Index)          83 99.1  F (37.3  C) (Tympanic) 99% 28.35 kg/m2         Blood Pressure from Last 3 Encounters:   06/27/18 116/84   06/18/18 124/80   06/01/18 116/68    Weight from Last 3 Encounters:   06/27/18 155 lb (70.3 kg)   06/01/18 155 lb (70.3 kg)   06/27/16 170 lb  (77.1 kg)              Today, you had the following     No orders found for display         Today's Medication Changes          These changes are accurate as of 6/27/18  6:59 PM.  If you have any questions, ask your nurse or doctor.               Stop taking these medicines if you haven't already. Please contact your care team if you have questions.     clonazePAM 1 MG tablet   Commonly known as:  klonoPIN   Stopped by:  Henny Naik APRN CNP           propranolol 10 MG tablet   Commonly known as:  INDERAL   Stopped by:  Henny Naik APRN CNP           sertraline 100 MG tablet   Commonly known as:  ZOLOFT   Stopped by:  Henny Naik APRN CNP           valACYclovir 1000 mg tablet   Commonly known as:  VALTREX   Stopped by:  Henny Naik APRN CNP                    Primary Care Provider Office Phone # Fax #    Denise HARDINGThu Lynne [phone] [address] [facility] [address]        Equal Access to Services     [facility]: Hadii aad ku hadasho Soomaali, waaxda luqadaha, qaybta kaalmada adeegyada, waxay idiin haygreern corie carrizales . So [facility] [phone].    ATENCIÓN: Si habla español, tiene a castillo disposición servicios gratuitos de asistencia lingüística. [facility] [phone].    We comply with applicable federal civil rights laws and [place] laws. We do not discriminate on the basis of race, color, national origin, age, disability, sex, sexual orientation, or gender identity.            Thank you!     Thank you for choosing [facility]  for your care. Our goal is always to provide you with excellent care. Hearing back from our patients is one way we can continue to improve our services. Please take a few minutes to complete the written survey that you may receive in the mail after your visit with us. Thank you!             Your Updated Medication List - Protect others around you: Learn how to safely  use, store and throw away your medicines at www.disposemymeds.org.          This list is accurate as of 6/27/18  6:59 PM.  Always use your most recent med list.                   Brand Name Dispense Instructions for use Diagnosis    BUSPAR PO      Take 15 mg by mouth 2 times daily        gabapentin 600 MG tablet    NEURONTIN     Take 600 mg by mouth 3 times daily        hydrOXYzine 50 MG capsule    VISTARIL     TAKE 1 CAPSULE BY MOUTH EVERY NIGHT AS NEEDED        lamoTRIgine 150 MG tablet    LaMICtal     Take 150 mg by mouth daily        mirtazapine 15 MG tablet    REMERON     TAKE 1 TABLET BY MOUTH ONCE DAILY AT BEDTIME        prazosin 2 MG capsule    MINIPRESS     TAKE 1 CAPSULE BY MOUTH ONCE DAILY AT BEDTIME        venlafaxine 150 MG Tb24 24 hr tablet    EFFEXOR-ER     Take 150 mg by mouth daily

## 2018-06-27 NOTE — NURSING NOTE
"Chief Complaint   Patient presents with     Consult       Initial /84 (BP Location: Left arm, Patient Position: Sitting, Cuff Size: Adult Regular)  Pulse 83  Temp 99.1  F (37.3  C) (Tympanic)  Wt 155 lb (70.3 kg)  SpO2 99%  BMI 28.35 kg/m2 Estimated body mass index is 28.35 kg/(m^2) as calculated from the following:    Height as of 6/1/18: 5' 2\" (1.575 m).    Weight as of this encounter: 155 lb (70.3 kg).  Medication Reconciliation: complete    Keily Artis LPN  "

## 2018-06-27 NOTE — PATIENT INSTRUCTIONS
[facility]  [address] [place]  Phone number [phone]      Scheduling appointments   [phone] or  [phone]      Nurse Judy GOEL [facility]  Family Nurse Practitioner

## 2018-06-27 NOTE — PROGRESS NOTES
SUBJECTIVE:   Kim Oliva is a 27 year old female who presents to clinic today for the following health issues:    Presents today to possible establish care. She states she may establish at [facility] with Keyla Marie in [place] as it is closer to her.     Kim sees Huber at [facility] monthly  And Imani with [facility] in [place] both for counseling.  Release of information signed for [facility]    Kim is prescribed medical marijuana (she did have her prescription card today) and uses Suboxone for her history of heroine use.       Reviewed medications today:    Effexor- daily  Gabapentin 600 mg 3 times a day. States she was on 800 mg 3 times a day  Lamictal 150 mg daily  Remeron 15 mg at night  Prazosin 2 mg at night  Hydroxyzine 50 mg at night  Buspar 15 mg 2 times a day    [facility] prescribes suboxone and receives medical MJ in [place]      Weaning off clonazepam.  Kim states she is on 1 mg daily for the past month. She is aware she needs to wean off due to Suboxone.          Bipolar/PTSD/boarderline personality/depression      Duration: na    Description (location/character/radiation): na    Intensity:  na    Accompanying signs and symptoms: na    History (similar episodes/previous evaluation): diagnosed at age 16    Precipitating or alleviating factors: no PCP has had multiple mental health providers    Therapies tried and outcome: in counseling at this time            Problem list and histories reviewed & adjusted, as indicated.  Additional history: as documented    Patient Active Problem List   Diagnosis     PTSD (post-traumatic stress disorder)     Anxiety     Mild recurrent major depression (H)     Abuse of other non-psychoactive substances     Borderline personality disorder     ACP (advance care planning)     Tachycardia     Depression     Altered mental state     Bipolar I disorder (H)     No past surgical history on file.    Social History   Substance Use Topics     Smoking status:  Current Every Day Smoker     Packs/day: 0.50     Smokeless tobacco: Never Used     Alcohol use No     No family history on file.      Current Outpatient Prescriptions   Medication Sig Dispense Refill     BusPIRone HCl (BUSPAR PO) Take 15 mg by mouth 2 times daily       gabapentin (NEURONTIN) 600 MG tablet Take 600 mg by mouth 3 times daily       hydrOXYzine (VISTARIL) 50 MG capsule TAKE 1 CAPSULE BY MOUTH EVERY NIGHT AS NEEDED  1     lamoTRIgine (LAMICTAL) 150 MG tablet Take 150 mg by mouth daily  0     mirtazapine (REMERON) 15 MG tablet TAKE 1 TABLET BY MOUTH ONCE DAILY AT BEDTIME  0     prazosin (MINIPRESS) 2 MG capsule TAKE 1 CAPSULE BY MOUTH ONCE DAILY AT BEDTIME  0     venlafaxine (EFFEXOR-ER) 150 MG TB24 24 hr tablet Take 150 mg by mouth daily  1     Allergies   Allergen Reactions     Amoxicillin Hives     Fd&C Blue #1-Fd&C Red #40-Fluoxetine Other (See Comments)     Other reaction(s): Joint Pain     Penicillins Hives     Prozac [Fluoxetine] Other (See Comments)     Muscle and bone aches and migraines       Reviewed and updated as needed this visit by clinical staff  Tobacco  Allergies  Meds       Reviewed and updated as needed this visit by Provider         ROS:  CONSTITUTIONAL: NEGATIVE for fever, chills, change in weight  INTEGUMENTARY/SKIN: NEGATIVE for worrisome rashes, moles or lesions  ENT/MOUTH: NEGATIVE for ear, mouth and throat problems  RESP: NEGATIVE for significant cough or SOB  CV: NEGATIVE for chest pain, palpitations or peripheral edema  GI: NEGATIVE for nausea, abdominal pain, heartburn, or change in bowel habits  : depo shot and denies dysuria  MUSCULOSKELETAL: NEGATIVE for significant arthralgias or myalgia  NEURO: NEGATIVE for weakness, dizziness or paresthesias  ENDOCRINE: NEGATIVE for temperature intolerance, skin/hair changes  PSYCHIATRIC: as above    OBJECTIVE:     /84 (BP Location: Left arm, Patient Position: Sitting, Cuff Size: Adult Regular)  Pulse 83  Temp 99.1  F (37.3   C) (Tympanic)  Wt 155 lb (70.3 kg)  SpO2 99%  BMI 28.35 kg/m2  Body mass index is 28.35 kg/(m^2).   GENERAL: healthy, alert and no distress  NECK: no adenopathy, no asymmetry, masses, or scars and thyroid normal to palpation  RESP: lungs clear to auscultation - no rales, rhonchi or wheezes  CV: regular rate and rhythm, normal S1 S2, no S3 or S4, no murmur, click or rub, no peripheral edema and peripheral pulses strong  ABDOMEN: soft, nontender, no hepatosplenomegaly, no masses and bowel sounds normal  PSYCH: mentation appears normal, affect normal/bright    Diagnostic Test Results:  Results for orders placed or performed in visit on 06/01/18   CBC with platelets   Result Value Ref Range    WBC 8.6 4.0 - 11.0 10e9/L    RBC Count 4.37 3.8 - 5.2 10e12/L    Hemoglobin 13.5 11.7 - 15.7 g/dL    Hematocrit 39.2 35.0 - 47.0 %    MCV 90 78 - 100 fl    MCH 30.9 26.5 - 33.0 pg    MCHC 34.4 31.5 - 36.5 g/dL    RDW 12.3 10.0 - 15.0 %    Platelet Count 261 150 - 450 10e9/L   Drug Screen Urine (Range)   Result Value Ref Range    Amphetamine Qual Urine Negative NEG^Negative    Barbiturates Qual Urine Negative NEG^Negative    Benzodiazepine Qual Urine Negative NEG^Negative    Cannabinoids Qual Urine Positive (A) NEG^Negative    Cocaine Qual Urine Negative NEG^Negative    Opiates Qualitative Urine Negative NEG^Negative    Methadone Qual Urine Negative NEG^Negative    PCP Qual Urine Negative NEG^Negative   [facility]: Laboratory Miscellaneous Order   Result Value Ref Range    Miscellaneous Test         Specimen Received, Reordered and sent to Performing laboratory - Report to follow upon   completion.     Urine Drugs of Abuse Screen (for Suboxone Therapy)   Result Value Ref Range    Cannabinoids (91-yaa-7-carboxy-9-THC) Detected, Abnormal Result (A) NDET^Not Detected ng/mL    Phencyclidine (Phencyclidine) Not Detected NDET^Not Detected ng/mL    Cocaine (Benzoylecgonine) Not Detected NDET^Not Detected ng/mL    Methamphetamine  (d-Methamphetamine) Not Detected NDET^Not Detected ng/mL    Opiates (Morphine) Not Detected NDET^Not Detected ng/mL    Amphetamine (d-Amphetamine) Not Detected NDET^Not Detected ng/mL    Benzodiazepines (Nordiazepam) Not Detected NDET^Not Detected ng/mL    Tricyclic Antidepressants (Desipramine) Not Detected NDET^Not Detected ng/mL    Methadone (Methadone) Not Detected NDET^Not Detected ng/mL    Barbiturates (Butalbital) Not Detected NDET^Not Detected ng/mL    Oxycodone (Oxycodone) Not Detected NDET^Not Detected ng/mL    Propoxyphene (Norpropoxyphene) Not Detected NDET^Not Detected ng/mL    Buprenorphine (Buprenorphine) Detected, Abnormal Result (A) NDET^Not Detected ng/mL       ASSESSMENT/PLAN:     1. Insomnia, unspecified type  Re-ordered   - hydrOXYzine (VISTARIL) 50 MG capsule; TAKE 1 CAPSULE BY MOUTH EVERY NIGHT AS NEEDED  Dispense: 30 capsule; Refill: 1    2. Anxiety  Weaning Kim off Clonazepam. Today received a prescription for 0.5 mg of clonazepam once a day 14 tablets. She should not receive any additional prescriptions for benzos while on Suboxone and medical marijuana. Kim is aware she will not get another prescription.  - clonazePAM (KLONOPIN) 0.5 MG tablet; Take 1 tablet (0.5 mg) by mouth daily as needed for anxiety  Dispense: 14 tablet; Refill: 0    Kim should follow up in 1 month     See Patient Instructions    MANASA Butterfield [facility] ANITA

## 2018-07-14 DIAGNOSIS — F51.5 NIGHTMARES ASSOCIATED WITH CHRONIC POST-TRAUMATIC STRESS DISORDER: ICD-10-CM

## 2018-07-14 DIAGNOSIS — F31.9 BIPOLAR 1 DISORDER, DEPRESSED (H): ICD-10-CM

## 2018-07-14 DIAGNOSIS — F43.12 NIGHTMARES ASSOCIATED WITH CHRONIC POST-TRAUMATIC STRESS DISORDER: ICD-10-CM

## 2018-07-14 DIAGNOSIS — F41.9 ANXIETY: ICD-10-CM

## 2018-07-16 RX ORDER — MIRTAZAPINE 15 MG/1
TABLET, FILM COATED ORAL
Qty: 30 TABLET | Refills: 0 | Status: SHIPPED | OUTPATIENT
Start: 2018-07-16 | End: 2019-01-10

## 2018-07-16 RX ORDER — PRAZOSIN HYDROCHLORIDE 2 MG/1
CAPSULE ORAL
Qty: 30 CAPSULE | Refills: 0 | Status: SHIPPED | OUTPATIENT
Start: 2018-07-16 | End: 2019-01-21

## 2018-07-16 RX ORDER — LAMOTRIGINE 150 MG/1
TABLET ORAL
Qty: 30 TABLET | Refills: 0 | Status: SHIPPED | OUTPATIENT
Start: 2018-07-16 | End: 2019-01-10

## 2018-07-16 RX ORDER — GABAPENTIN 600 MG/1
TABLET ORAL
Qty: 90 TABLET | Refills: 0 | Status: SHIPPED | OUTPATIENT
Start: 2018-07-16 | End: 2019-01-10

## 2018-07-16 NOTE — TELEPHONE ENCOUNTER
Patient was seeing Iwona Gilman for Med management and Iwona is no longer here.  Will you fill these either permanently or temporarily until patient can establish with new psych provider? Please advise. Thank you

## 2018-07-16 NOTE — TELEPHONE ENCOUNTER
lamictal      Last Written Prescription Date:  Historical  Last Fill Quantity: ,   # refills:   Last Office Visit: 6/27/18 with ruma antonia  Future Office visit: none      remeron      Last Written Prescription Date:  historical  Last Fill Quantity: ,   # refills:   Last Office Visit: 6/27/18      prazosin      Last Written Prescription Date:  historical  Last Fill Quantity: ,   # refills:   Last Office Visit: 6/27/18    gabapentin      Last Written Prescription Date:  historical  Last Fill Quantity: ,   # refills:   Last Office Visit: 6/27/18

## 2018-07-17 RX ORDER — BUSPIRONE HYDROCHLORIDE 15 MG/1
15 TABLET ORAL 2 TIMES DAILY
Qty: 60 TABLET | Refills: 0 | COMMUNITY
Start: 2018-07-17 | End: 2019-01-10

## 2018-07-23 DIAGNOSIS — F41.9 ANXIETY: ICD-10-CM

## 2018-07-25 RX ORDER — BUSPIRONE HYDROCHLORIDE 15 MG/1
TABLET ORAL
Qty: 60 TABLET | Refills: 0 | Status: SHIPPED | OUTPATIENT
Start: 2018-07-25

## 2018-07-25 NOTE — TELEPHONE ENCOUNTER
I have not seen her for 2 years, It appears she has seen Henny Naik most recently.  I will forward this to her for review.

## 2018-07-25 NOTE — TELEPHONE ENCOUNTER
teresa       Last Written Prescription Date:  7/17/2018  Last Fill Quantity: 60,   # refills: 0  Last Office Visit: 6/27/2018  Future Office visit:

## 2018-07-25 NOTE — TELEPHONE ENCOUNTER
Buspar - last filled by Iwona Gilman who is no longer here.  Are you willing to sign for this indefinitely or until patient re-establishes with new mental health provider?   Last visit: 6.27.18

## 2018-08-03 ENCOUNTER — TRANSFERRED RECORDS (OUTPATIENT)
Dept: HEALTH INFORMATION MANAGEMENT | Facility: CLINIC | Age: 28
End: 2018-08-03

## 2018-10-22 ENCOUNTER — HEALTH MAINTENANCE LETTER (OUTPATIENT)
Age: 28
End: 2018-10-22

## 2018-11-20 ENCOUNTER — TELEPHONE (OUTPATIENT)
Dept: FAMILY MEDICINE | Facility: OTHER | Age: 28
End: 2018-11-20

## 2018-11-20 NOTE — TELEPHONE ENCOUNTER
Please inform pt that care coordinator will call her in regards to Suboxone therapy.  A telephone screen will be completed and then will be discussed with Dr. Zina Hernández.  Please inform her that phone call will be placed on Wednesday 11.20.18.  Please get current phone number that she wishes to be called at.    Thank you,  Abbi Pena RN-BSN  Chronic Pain Care Coordinator  [phone] opt. #3

## 2018-11-21 ENCOUNTER — PATIENT OUTREACH (OUTPATIENT)
Dept: CARE COORDINATION | Facility: OTHER | Age: 28
End: 2018-11-21

## 2018-11-21 NOTE — PROGRESS NOTES
Clinic Care Coordination Contact  Care Team Conversations    Outreach to pt via telephone this date.  She is interested in switching to Dr. Zina Hernández to obtain her Suboxone medication.  Has been on Suboxone since Feb 2018.  Goes to [facility] for this.  Telephone screen complete and reviewed with Dr. Hernández.  LM on pt's personal cell phone (Ok'd to leave message per pt) requesting that her records be faxed to Dr. Zina Hernández at [phone].  Also left her CC's direct phone line to call with any further questions.  Will await records.    Abbi Pena, RN-BSN  Chronic Pain Care Coordinator  [phone] opt. #3

## 2018-12-13 ENCOUNTER — TRANSFERRED RECORDS (OUTPATIENT)
Dept: HEALTH INFORMATION MANAGEMENT | Facility: CLINIC | Age: 28
End: 2018-12-13

## 2018-12-17 ENCOUNTER — PATIENT OUTREACH (OUTPATIENT)
Dept: CARE COORDINATION | Facility: OTHER | Age: 28
End: 2018-12-17

## 2018-12-17 NOTE — PROGRESS NOTES
Clinic Care Coordination Contact  Care Team Conversations    Outgoing phone call to pt this date.  Records received from [facility].  Dr. Hernández willing to see pt for possible suboxone therapy.  Appt made for 1.10.19 @ 11:15 AM per pt request.  She is seeing her therapist on 1.4.19 at [facility] in [place]. She will sign a HARITHA that day and have records faxed to Dr. Hernández.  Fax # provided to pt this date.  Did reiterate to pt, that this visit is a consult and Dr. Hernández will decide if she is willing to prescribe Suboxone.  Pt verbalized understanding.  All questions answered.  Encouraged her to call CC with any questions/concerns/problems.  Verbalized understanding.    Abbi Pena RN-BSN  Chronic Pain Care Coordinator  [phone] opt. #3

## 2019-01-10 ENCOUNTER — OFFICE VISIT (OUTPATIENT)
Dept: FAMILY MEDICINE | Facility: OTHER | Age: 29
End: 2019-01-10
Attending: FAMILY MEDICINE
Payer: COMMERCIAL

## 2019-01-10 ENCOUNTER — PATIENT OUTREACH (OUTPATIENT)
Dept: CARE COORDINATION | Facility: OTHER | Age: 29
End: 2019-01-10

## 2019-01-10 VITALS
WEIGHT: 154.2 LBS | TEMPERATURE: 99.2 F | HEART RATE: 83 BPM | SYSTOLIC BLOOD PRESSURE: 104 MMHG | OXYGEN SATURATION: 98 % | DIASTOLIC BLOOD PRESSURE: 58 MMHG | BODY MASS INDEX: 28.2 KG/M2

## 2019-01-10 DIAGNOSIS — F11.90 OPIOID USE DISORDER: Primary | ICD-10-CM

## 2019-01-10 LAB
ALBUMIN SERPL-MCNC: 4 G/DL (ref 3.4–5)
ALP SERPL-CCNC: 94 U/L (ref 40–150)
ALT SERPL W P-5'-P-CCNC: 22 U/L (ref 0–50)
AMPHETAMINES UR QL: NOT DETECTED NG/ML
ANION GAP SERPL CALCULATED.3IONS-SCNC: 3 MMOL/L (ref 3–14)
AST SERPL W P-5'-P-CCNC: 17 U/L (ref 0–45)
BARBITURATES UR QL SCN: NOT DETECTED NG/ML
BASOPHILS # BLD AUTO: 0 10E9/L (ref 0–0.2)
BASOPHILS NFR BLD AUTO: 0.5 %
BENZODIAZ UR QL SCN: ABNORMAL NG/ML
BILIRUB DIRECT SERPL-MCNC: 0.1 MG/DL (ref 0–0.2)
BILIRUB SERPL-MCNC: 0.4 MG/DL (ref 0.2–1.3)
BUN SERPL-MCNC: 10 MG/DL (ref 7–30)
BUPRENORPHINE UR QL: ABNORMAL NG/ML
CALCIUM SERPL-MCNC: 9.2 MG/DL (ref 8.5–10.1)
CANNABINOIDS UR QL: ABNORMAL NG/ML
CHLORIDE SERPL-SCNC: 110 MMOL/L (ref 94–109)
CO2 SERPL-SCNC: 26 MMOL/L (ref 20–32)
COCAINE UR QL SCN: NOT DETECTED NG/ML
CREAT SERPL-MCNC: 0.69 MG/DL (ref 0.52–1.04)
D-METHAMPHET UR QL: NOT DETECTED NG/ML
DIFFERENTIAL METHOD BLD: NORMAL
EOSINOPHIL # BLD AUTO: 0.2 10E9/L (ref 0–0.7)
EOSINOPHIL NFR BLD AUTO: 2.3 %
ERYTHROCYTE [DISTWIDTH] IN BLOOD BY AUTOMATED COUNT: 12.7 % (ref 10–15)
GFR SERPL CREATININE-BSD FRML MDRD: >90 ML/MIN/{1.73_M2}
GLUCOSE SERPL-MCNC: 90 MG/DL (ref 70–99)
HCG UR QL: NEGATIVE
HCT VFR BLD AUTO: 39.1 % (ref 35–47)
HGB BLD-MCNC: 13.1 G/DL (ref 11.7–15.7)
IMM GRANULOCYTES # BLD: 0 10E9/L (ref 0–0.4)
IMM GRANULOCYTES NFR BLD: 0.2 %
LYMPHOCYTES # BLD AUTO: 2.6 10E9/L (ref 0.8–5.3)
LYMPHOCYTES NFR BLD AUTO: 38.8 %
MCH RBC QN AUTO: 29.7 PG (ref 26.5–33)
MCHC RBC AUTO-ENTMCNC: 33.5 G/DL (ref 31.5–36.5)
MCV RBC AUTO: 89 FL (ref 78–100)
METHADONE UR QL SCN: NOT DETECTED NG/ML
MONOCYTES # BLD AUTO: 0.5 10E9/L (ref 0–1.3)
MONOCYTES NFR BLD AUTO: 7.3 %
NEUTROPHILS # BLD AUTO: 3.4 10E9/L (ref 1.6–8.3)
NEUTROPHILS NFR BLD AUTO: 50.9 %
NRBC # BLD AUTO: 0 10*3/UL
NRBC BLD AUTO-RTO: 0 /100
OPIATES UR QL SCN: NOT DETECTED NG/ML
OXYCODONE UR QL SCN: NOT DETECTED NG/ML
PCP UR QL SCN: NOT DETECTED NG/ML
PLATELET # BLD AUTO: 255 10E9/L (ref 150–450)
POTASSIUM SERPL-SCNC: 4.1 MMOL/L (ref 3.4–5.3)
PROPOXYPH UR QL: NOT DETECTED NG/ML
PROT SERPL-MCNC: 7.1 G/DL (ref 6.8–8.8)
RBC # BLD AUTO: 4.41 10E12/L (ref 3.8–5.2)
SODIUM SERPL-SCNC: 139 MMOL/L (ref 133–144)
TRICYCLICS UR QL SCN: NOT DETECTED NG/ML
WBC # BLD AUTO: 6.6 10E9/L (ref 4–11)

## 2019-01-10 PROCEDURE — 87522 HEPATITIS C REVRS TRNSCRPJ: CPT | Mod: ZL | Performed by: FAMILY MEDICINE

## 2019-01-10 PROCEDURE — 80048 BASIC METABOLIC PNL TOTAL CA: CPT | Mod: ZL | Performed by: FAMILY MEDICINE

## 2019-01-10 PROCEDURE — 85025 COMPLETE CBC W/AUTO DIFF WBC: CPT | Mod: ZL | Performed by: FAMILY MEDICINE

## 2019-01-10 PROCEDURE — 99000 SPECIMEN HANDLING OFFICE-LAB: CPT | Performed by: FAMILY MEDICINE

## 2019-01-10 PROCEDURE — 80076 HEPATIC FUNCTION PANEL: CPT | Mod: ZL | Performed by: FAMILY MEDICINE

## 2019-01-10 PROCEDURE — 87389 HIV-1 AG W/HIV-1&-2 AB AG IA: CPT | Mod: ZL | Performed by: FAMILY MEDICINE

## 2019-01-10 PROCEDURE — 86803 HEPATITIS C AB TEST: CPT | Mod: ZL | Performed by: FAMILY MEDICINE

## 2019-01-10 PROCEDURE — 99214 OFFICE O/P EST MOD 30 MIN: CPT | Performed by: FAMILY MEDICINE

## 2019-01-10 PROCEDURE — 81025 URINE PREGNANCY TEST: CPT | Mod: ZL | Performed by: FAMILY MEDICINE

## 2019-01-10 PROCEDURE — 86706 HEP B SURFACE ANTIBODY: CPT | Mod: ZL | Performed by: FAMILY MEDICINE

## 2019-01-10 PROCEDURE — 36415 COLL VENOUS BLD VENIPUNCTURE: CPT | Mod: ZL | Performed by: FAMILY MEDICINE

## 2019-01-10 PROCEDURE — 87340 HEPATITIS B SURFACE AG IA: CPT | Mod: ZL | Performed by: FAMILY MEDICINE

## 2019-01-10 PROCEDURE — 86708 HEPATITIS A ANTIBODY: CPT | Mod: ZL | Performed by: FAMILY MEDICINE

## 2019-01-10 PROCEDURE — 80306 DRUG TEST PRSMV INSTRMNT: CPT | Mod: ZL | Performed by: FAMILY MEDICINE

## 2019-01-10 PROCEDURE — G0463 HOSPITAL OUTPT CLINIC VISIT: HCPCS

## 2019-01-10 RX ORDER — BUPRENORPHINE HYDROCHLORIDE AND NALOXONE HYDROCHLORIDE DIHYDRATE 2; .5 MG/1; MG/1
1 TABLET SUBLINGUAL DAILY
COMMUNITY

## 2019-01-10 RX ORDER — BUPRENORPHINE HYDROCHLORIDE AND NALOXONE HYDROCHLORIDE DIHYDRATE 8; 2 MG/1; MG/1
2 TABLET SUBLINGUAL DAILY
COMMUNITY

## 2019-01-10 RX ORDER — ZOLPIDEM TARTRATE 10 MG/1
10 TABLET ORAL
COMMUNITY

## 2019-01-10 ASSESSMENT — PATIENT HEALTH QUESTIONNAIRE - PHQ9: SUM OF ALL RESPONSES TO PHQ QUESTIONS 1-9: 4

## 2019-01-10 ASSESSMENT — ANXIETY QUESTIONNAIRES
1. FEELING NERVOUS, ANXIOUS, OR ON EDGE: SEVERAL DAYS
GAD7 TOTAL SCORE: 6
3. WORRYING TOO MUCH ABOUT DIFFERENT THINGS: SEVERAL DAYS
6. BECOMING EASILY ANNOYED OR IRRITABLE: SEVERAL DAYS
5. BEING SO RESTLESS THAT IT IS HARD TO SIT STILL: SEVERAL DAYS
IF YOU CHECKED OFF ANY PROBLEMS ON THIS QUESTIONNAIRE, HOW DIFFICULT HAVE THESE PROBLEMS MADE IT FOR YOU TO DO YOUR WORK, TAKE CARE OF THINGS AT HOME, OR GET ALONG WITH OTHER PEOPLE: SOMEWHAT DIFFICULT
2. NOT BEING ABLE TO STOP OR CONTROL WORRYING: SEVERAL DAYS
4. TROUBLE RELAXING: SEVERAL DAYS
7. FEELING AFRAID AS IF SOMETHING AWFUL MIGHT HAPPEN: NOT AT ALL

## 2019-01-10 ASSESSMENT — PAIN SCALES - GENERAL: PAINLEVEL: NO PAIN (0)

## 2019-01-10 NOTE — PROGRESS NOTES
"Follow-up Buprenorphine Visit           HPI       Kim Oliva is here for f/u on buprenophine/naloxone (Suboxone) therapy for opioid use disorder.  Medication Follow-up    Follows with [facility] for mental health.  Follows at [facility] for opioid use disorder.  MAT.    Kim is currently taking 18 mg of Buprenorphine/Naloxone  daily. This is taken in 2 dose(s)  daily. This dosage is working to keep cravings at a manageable level. Aside from this, Kim is currently participating in the following recovery activities attendance at NA/AA meetings several times a month and [employer] meetings.    Had ongoing relapses.  Was in longterm for 20 days a year ago.  Started Suboxone 2/2018.  Was going 6 days per week to [place] to be dosed.  Now doing 3 days per week.   2 8 mg + 2mg tab = 18 mg.  This has been since June or so.  This dose controls cravings well.  Always has been once daily when she goes.  Splits dosing on her take home - 10mg AM, 8 mg afternoon.    Side effects - constipation.    Triggers - being around other substance users; anxiety  Removed from family - \"dead or locked up\".     Goes to Worship every Sunday.  Does nursery once per month.  Assisted  for [employer].  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      NA meetings and staff support and Worship.  Oldest of 6 siblings.  1 brother passed.  Limited interaction with mom.  Mom use to supply patient with opiates.  Brother overdosed on Methadone at her house.  2 children - Bruce, 5, Selina, 3.  Fathers not involved.    Other medical concerns: No; does have hepatitis C carrier status - no active treatment or past     Any ED visits? No     Substance Use  Any opioids other than suboxone? No; prior Methadone - had seizures, withdrawal   Any alcohol use? No - not an issue since 21 - more of teenage years  Any other recreational drug use? No - medical marijuana; cigarettes and caffeine    History   Smoking Status     Current Every Day Smoker     Packs/day: " 0.50   Smokeless Tobacco     Never Used       Problem, Medication and Allergy Lists were reviewed and are current..    Pharmacy Database reviewed? yes    Patient is a new patient to this clinic and so  I reviewed/updated the Past Medical History, the Family History and the Social History .         Review of Systems:   Review of Systems  CONSTITUTIONAL: NEGATIVE for fever, chills, change in weight  INTEGUMENTARY/SKIN: NEGATIVE for worrisome rashes, moles or lesions  EYES: NEGATIVE for vision changes or irritation  ENT/MOUTH: NEGATIVE for ear, mouth and throat problems  RESP: NEGATIVE for significant cough or SOB  BREAST: NEGATIVE for masses, tenderness or discharge  CV: NEGATIVE for chest pain, palpitations or peripheral edema  GI: NEGATIVE for nausea, abdominal pain, heartburn; POSITIVE for constipation  : NEGATIVE for frequency, dysuria, or hematuria  MUSCULOSKELETAL: NEGATIVE for significant arthralgias or myalgia  NEURO: NEGATIVE for weakness, dizziness or paresthesias  ENDOCRINE: NEGATIVE for temperature intolerance, skin/hair changes  HEME/ALLERGY: NEGATIVE for bleeding problems  PSYCHIATRIC: NEGATIVE for changes in mood or affect          Physical Exam:     Vitals:    01/10/19 1130   BP: 104/58   BP Location: Left arm   Patient Position: Sitting   Cuff Size: Adult Regular   Pulse: 83   Temp: 99.2  F (37.3  C)   TempSrc: Tympanic   SpO2: 98%   Weight: 69.9 kg (154 lb 3.2 oz)     Body mass index is 28.2 kg/m .     Physical Exam  GENERAL APPEARANCE: alert, comfortable appearing  EYES: Eyes grossly normal to inspection  HENT:  nose no rhinorrhea  RESP: lungs clear to auscultation - no rales, rhonchi or wheezes and no resp distress  CV: regular rates and rhythm, normal S1 S2,no murmur   SKIN: no rashes, no jaundice, no obvious masses.   NEURO:  no tremor  MENTAL STATUS EXAM:  Appearance/Behavior:No apparent distress  Speech: Normal  Mood/Affect: normal affect  Insight: Adequate      Results:      Results from the  last 24 hours  Results for orders placed or performed in visit on 01/10/19 (from the past 24 hour(s))   HCG Qual, Urine - [facility],  Range, Eldred  ([id number])   Result Value Ref Range    HCG Qual Urine Negative NEG^Negative   Urine Drugs of Abuse Screen (for Suboxone Therapy)   Result Value Ref Range    Cannabinoids (86-gld-8-carboxy-9-THC) Detected, Abnormal Result (A) NDET^Not Detected ng/mL    Phencyclidine (Phencyclidine) Not Detected NDET^Not Detected ng/mL    Cocaine (Benzoylecgonine) Not Detected NDET^Not Detected ng/mL    Methamphetamine (d-Methamphetamine) Not Detected NDET^Not Detected ng/mL    Opiates (Morphine) Not Detected NDET^Not Detected ng/mL    Amphetamine (d-Amphetamine) Not Detected NDET^Not Detected ng/mL    Benzodiazepines (Nordiazepam) Detected, Abnormal Result (A) NDET^Not Detected ng/mL    Tricyclic Antidepressants (Desipramine) Not Detected NDET^Not Detected ng/mL    Methadone (Methadone) Not Detected NDET^Not Detected ng/mL    Barbiturates (Butalbital) Not Detected NDET^Not Detected ng/mL    Oxycodone (Oxycodone) Not Detected NDET^Not Detected ng/mL    Propoxyphene (Norpropoxyphene) Not Detected NDET^Not Detected ng/mL    Buprenorphine (Buprenorphine) Detected, Abnormal Result (A) NDET^Not Detected ng/mL   CBC with platelets and differential   Result Value Ref Range    WBC 6.6 4.0 - 11.0 10e9/L    RBC Count 4.41 3.8 - 5.2 10e12/L    Hemoglobin 13.1 11.7 - 15.7 g/dL    Hematocrit 39.1 35.0 - 47.0 %    MCV 89 78 - 100 fl    MCH 29.7 26.5 - 33.0 pg    MCHC 33.5 31.5 - 36.5 g/dL    RDW 12.7 10.0 - 15.0 %    Platelet Count 255 150 - 450 10e9/L    Diff Method Automated Method     % Neutrophils 50.9 %    % Lymphocytes 38.8 %    % Monocytes 7.3 %    % Eosinophils 2.3 %    % Basophils 0.5 %    % Immature Granulocytes 0.2 %    Nucleated RBCs 0 0 /100    Absolute Neutrophil 3.4 1.6 - 8.3 10e9/L    Absolute Lymphocytes 2.6 0.8 - 5.3 10e9/L    Absolute Monocytes 0.5 0.0 - 1.3 10e9/L    Absolute  Eosinophils 0.2 0.0 - 0.7 10e9/L    Absolute Basophils 0.0 0.0 - 0.2 10e9/L    Abs Immature Granulocytes 0.0 0 - 0.4 10e9/L    Absolute Nucleated RBC 0.0    Basic metabolic panel   Result Value Ref Range    Sodium 139 133 - 144 mmol/L    Potassium 4.1 3.4 - 5.3 mmol/L    Chloride 110 (H) 94 - 109 mmol/L    Carbon Dioxide 26 20 - 32 mmol/L    Anion Gap 3 3 - 14 mmol/L    Glucose 90 70 - 99 mg/dL    Urea Nitrogen 10 7 - 30 mg/dL    Creatinine 0.69 0.52 - 1.04 mg/dL    GFR Estimate >90 >60 mL/min/[1.73_m2]    GFR Estimate If Black >90 >60 mL/min/[1.73_m2]    Calcium 9.2 8.5 - 10.1 mg/dL   Hepatic panel (Albumin, ALT, AST, Bili, Alk Phos, TP)   Result Value Ref Range    Bilirubin Direct 0.1 0.0 - 0.2 mg/dL    Bilirubin Total 0.4 0.2 - 1.3 mg/dL    Albumin 4.0 3.4 - 5.0 g/dL    Protein Total 7.1 6.8 - 8.8 g/dL    Alkaline Phosphatase 94 40 - 150 U/L    ALT 22 0 - 50 U/L    AST 17 0 - 45 U/L       Assessment and Plan     Was naloxone nasal spray prescribed? Yes.  1. Opioid use disorder (H)  Introductory visit.  Still awaiting records form [facility] to verify status of mental health follow up and prescriptions.    Caution given additive effects of respiratory depression - Ambien, Klonopin, cannabis, Suboxone.   Her Klonopin use is limited 1 mg every other day as needed, max 17 tablets for 30 days.  Extensive anxiety, PTSD history.    Seems to have appropriate support in place - [employer], [employer] meetings.   She is working.  She is caring for her 2 children independently.     - HIV Antigen Antibody Combo; Future  - Hepatitis A Antibody IgG; Future  - Hepatitis B Surface Antibody; Future  - Hepatitis B surface antigen; Future  - Hepatitis C Screen Reflex to HCV RNA Quant and Genotype; Future  - Hepatic panel (Albumin, ALT, AST, Bili, Alk Phos, TP); Future  - Basic metabolic panel; Future  - CBC with platelets and differential; Future  - Urine Drugs of Abuse Screen (for Suboxone Therapy); Future  - HCG Qual, Urine -  [facility]  ([id number]); Future  - HCG Qual, Urine - [facility] [place]  ([id number])  - Urine Drugs of Abuse Screen (for Suboxone Therapy)  - CBC with platelets and differential  - Basic metabolic panel  - Hepatic panel (Albumin, ALT, AST, Bili, Alk Phos, TP)  - Hepatitis C Screen Reflex to HCV RNA Quant and Genotype  - Hepatitis B surface antigen  - Hepatitis B Surface Antibody  - Hepatitis A Antibody IgG  - HIV Antigen Antibody Combo    Dosage will not need adjustment today.  Continue at 18 mg  divided into 2 time(s) a day of  buprenorphine/naloxone (Suboxone).     Follow up Plan  No prescription given today   Awaiting results of labs today and records from mental health provider.  [facility] records reviewed.  Care coordination present for visit today as well.    Greater than 25 minutes was spent with this patient, over half of which was on counseling and coordination of care which includes importance of recovery activities,which may include  attendance at NA/AA meetings, sober support network, and the importance of not isolating, being open, honest, and willing to change, possible triggers and how to avoid them, and managing cravings.    Medications Discontinued During This Encounter   Medication Reason     busPIRone (BUSPAR) 15 MG tablet      gabapentin (NEURONTIN) 600 MG tablet      gabapentin (NEURONTIN) 600 MG tablet      lamoTRIgine (LAMICTAL) 150 MG tablet      mirtazapine (REMERON) 15 MG tablet      mirtazapine (REMERON) 15 MG tablet      prazosin (MINIPRESS) 2 MG capsule      naloxone (NARCAN) 4 MG/0.1ML nasal spray        Options for treatment and follow-up care were reviewed with the patient. Kim engaged in the decision making process and verbalized understanding of the options discussed and agreed with the final plan.    Zina Chung MD

## 2019-01-10 NOTE — NURSING NOTE
"Chief Complaint   Patient presents with     Medication Follow-up       Initial /58 (BP Location: Left arm, Patient Position: Sitting, Cuff Size: Adult Regular)   Pulse 83   Temp 99.2  F (37.3  C) (Tympanic)   Wt 69.9 kg (154 lb 3.2 oz)   SpO2 98%   BMI 28.20 kg/m   Estimated body mass index is 28.2 kg/m  as calculated from the following:    Height as of 6/1/18: 1.575 m (5' 2\").    Weight as of this encounter: 69.9 kg (154 lb 3.2 oz).  Medication Reconciliation: complete    Komal Holloway LPN  "

## 2019-01-10 NOTE — PROGRESS NOTES
Clinic Care Coordination Contact  Care Team Conversations    Care coordinator attended office visit with pt and Dr. Hernández this date.  Please refer to Dr. Hernández's note for plan of care.  Dr. Hernández currently awaiting notes from [facility].  Once notes are received and reviewed, if Dr. Hernández is willing to accept pt for Suboxone treatment, CC will reach out to pt and get an appt scheduled.  Provided pt with CC's business card and encouraged her to call with any questions/concerns/problems.  Verbalized understanding.    Abbi Pena RN-BSN  Chronic Pain Care Coordinator  [phone] opt. #3

## 2019-01-11 LAB
HAV IGG SER QL IA: NONREACTIVE
HBV SURFACE AB SERPL IA-ACNC: 69.15 M[IU]/ML
HBV SURFACE AG SERPL QL IA: NONREACTIVE
HIV 1+2 AB+HIV1 P24 AG SERPL QL IA: NONREACTIVE

## 2019-01-11 ASSESSMENT — ANXIETY QUESTIONNAIRES: GAD7 TOTAL SCORE: 6

## 2019-01-14 LAB
HCV AB SERPL QL IA: REACTIVE
HCV RNA SERPL NAA+PROBE-ACNC: NORMAL [IU]/ML
HCV RNA SERPL NAA+PROBE-LOG IU: NORMAL LOG IU/ML

## 2019-01-21 ENCOUNTER — TELEPHONE (OUTPATIENT)
Dept: FAMILY MEDICINE | Facility: OTHER | Age: 29
End: 2019-01-21

## 2019-01-21 DIAGNOSIS — F41.9 ANXIETY: ICD-10-CM

## 2019-01-21 DIAGNOSIS — F43.12 NIGHTMARES ASSOCIATED WITH CHRONIC POST-TRAUMATIC STRESS DISORDER: ICD-10-CM

## 2019-01-21 DIAGNOSIS — F51.5 NIGHTMARES ASSOCIATED WITH CHRONIC POST-TRAUMATIC STRESS DISORDER: ICD-10-CM

## 2019-01-21 RX ORDER — PRAZOSIN HYDROCHLORIDE 1 MG/1
2 CAPSULE ORAL AT BEDTIME
COMMUNITY
Start: 2019-01-21

## 2019-01-21 RX ORDER — LAMOTRIGINE 200 MG/1
200 TABLET ORAL DAILY
COMMUNITY
Start: 2019-01-21

## 2019-01-21 NOTE — TELEPHONE ENCOUNTER
Received records from [facility].  Medication list updated, except unable to change the Klonopin - which should be 1 mg po daily prn 4 times per week.  Abbi - please review records as well.  I do not see any awareness/mention of [facility] provider, that patient is on medical cannabis or Suboxone?  Nor opioid use disorder listed on her diagnoses there?

## 2019-01-22 NOTE — TELEPHONE ENCOUNTER
Phone call placed to pt this date.  Informed her that [facility] only sent 1 note - the initial visit with them on 8.3.18.  She stated she will call and have them send all the records.  Encouraged her to call with any questions/concerns/problems.  Verbalized understanding.    Abbi Pena RN-BSN  Chronic Pain Care Coordinator  [phone] opt. #3

## 2019-11-12 ENCOUNTER — APPOINTMENT (OUTPATIENT)
Dept: OCCUPATIONAL MEDICINE | Facility: OTHER | Age: 29
End: 2019-11-12

## 2019-11-12 PROCEDURE — 99000 SPECIMEN HANDLING OFFICE-LAB: CPT

## 2023-11-02 ENCOUNTER — PATIENT OUTREACH (OUTPATIENT)
Dept: CARE COORDINATION | Facility: OTHER | Age: 33
End: 2023-11-02

## 2023-11-02 NOTE — PROGRESS NOTES
Clinic Care Coordination Contact  Care Team Conversations    CC received VM from Kim this date requesting a return phone call.  Interested in setting up an appointment for MOUD/Suboxone.  CC placed return phone call - a male answered the phone and he stated that she would call back.      LEILA did reach out to Betty Castano, RN-BSN, Substance Use  at [facility] this date to see if [facility] in [place] had any availability to see new MOUD patients.  She stated that yes, Dr. Ross is taking on new MOUD patients.  Phone number to make an appointment with Dr. Ross is [phone].     CC will wait to hear back from Kim and gather more information, but it appears that she lives in [place], [place], and [place] may be closer to her.    We have seen Kim one time in the past - January 2019 - for MOUD. She never made follow up appointment with us.  Was following with [facility] at that time.    Abbi Pena RN-BSN, [facility] Care Coordinator  [phone] opt. #3